# Patient Record
Sex: FEMALE | Race: WHITE | NOT HISPANIC OR LATINO | URBAN - METROPOLITAN AREA
[De-identification: names, ages, dates, MRNs, and addresses within clinical notes are randomized per-mention and may not be internally consistent; named-entity substitution may affect disease eponyms.]

---

## 2019-08-11 ENCOUNTER — EMERGENCY (EMERGENCY)
Facility: HOSPITAL | Age: 44
LOS: 0 days | Discharge: HOME | End: 2019-08-11
Attending: EMERGENCY MEDICINE | Admitting: EMERGENCY MEDICINE
Payer: COMMERCIAL

## 2019-08-11 VITALS
OXYGEN SATURATION: 98 % | DIASTOLIC BLOOD PRESSURE: 81 MMHG | SYSTOLIC BLOOD PRESSURE: 128 MMHG | RESPIRATION RATE: 16 BRPM

## 2019-08-11 VITALS
WEIGHT: 195.11 LBS | SYSTOLIC BLOOD PRESSURE: 155 MMHG | TEMPERATURE: 99 F | OXYGEN SATURATION: 98 % | HEART RATE: 86 BPM | DIASTOLIC BLOOD PRESSURE: 114 MMHG | RESPIRATION RATE: 17 BRPM

## 2019-08-11 DIAGNOSIS — N83.201 UNSPECIFIED OVARIAN CYST, RIGHT SIDE: ICD-10-CM

## 2019-08-11 DIAGNOSIS — R93.89 ABNORMAL FINDINGS ON DIAGNOSTIC IMAGING OF OTHER SPECIFIED BODY STRUCTURES: ICD-10-CM

## 2019-08-11 DIAGNOSIS — R10.9 UNSPECIFIED ABDOMINAL PAIN: ICD-10-CM

## 2019-08-11 DIAGNOSIS — Z88.8 ALLERGY STATUS TO OTHER DRUGS, MEDICAMENTS AND BIOLOGICAL SUBSTANCES STATUS: ICD-10-CM

## 2019-08-11 LAB
ALBUMIN SERPL ELPH-MCNC: 4.4 G/DL — SIGNIFICANT CHANGE UP (ref 3.5–5.2)
ALP SERPL-CCNC: 56 U/L — SIGNIFICANT CHANGE UP (ref 30–115)
ALT FLD-CCNC: 12 U/L — SIGNIFICANT CHANGE UP (ref 0–41)
ANION GAP SERPL CALC-SCNC: 11 MMOL/L — SIGNIFICANT CHANGE UP (ref 7–14)
APPEARANCE UR: ABNORMAL
AST SERPL-CCNC: 16 U/L — SIGNIFICANT CHANGE UP (ref 0–41)
BACTERIA # UR AUTO: ABNORMAL
BASOPHILS # BLD AUTO: 0.04 K/UL — SIGNIFICANT CHANGE UP (ref 0–0.2)
BASOPHILS NFR BLD AUTO: 0.3 % — SIGNIFICANT CHANGE UP (ref 0–1)
BILIRUB SERPL-MCNC: 1 MG/DL — SIGNIFICANT CHANGE UP (ref 0.2–1.2)
BILIRUB UR-MCNC: NEGATIVE — SIGNIFICANT CHANGE UP
BUN SERPL-MCNC: 11 MG/DL — SIGNIFICANT CHANGE UP (ref 10–20)
CALCIUM SERPL-MCNC: 9.6 MG/DL — SIGNIFICANT CHANGE UP (ref 8.5–10.1)
CHLORIDE SERPL-SCNC: 102 MMOL/L — SIGNIFICANT CHANGE UP (ref 98–110)
CO2 SERPL-SCNC: 25 MMOL/L — SIGNIFICANT CHANGE UP (ref 17–32)
COLOR SPEC: YELLOW — SIGNIFICANT CHANGE UP
CREAT SERPL-MCNC: 0.8 MG/DL — SIGNIFICANT CHANGE UP (ref 0.7–1.5)
DIFF PNL FLD: ABNORMAL
EOSINOPHIL # BLD AUTO: 0.1 K/UL — SIGNIFICANT CHANGE UP (ref 0–0.7)
EOSINOPHIL NFR BLD AUTO: 0.8 % — SIGNIFICANT CHANGE UP (ref 0–8)
EPI CELLS # UR: ABNORMAL /HPF
GLUCOSE SERPL-MCNC: 84 MG/DL — SIGNIFICANT CHANGE UP (ref 70–99)
GLUCOSE UR QL: NEGATIVE MG/DL — SIGNIFICANT CHANGE UP
HCT VFR BLD CALC: 40.7 % — SIGNIFICANT CHANGE UP (ref 37–47)
HGB BLD-MCNC: 13.7 G/DL — SIGNIFICANT CHANGE UP (ref 12–16)
IMM GRANULOCYTES NFR BLD AUTO: 0.3 % — SIGNIFICANT CHANGE UP (ref 0.1–0.3)
KETONES UR-MCNC: NEGATIVE — SIGNIFICANT CHANGE UP
LEUKOCYTE ESTERASE UR-ACNC: ABNORMAL
LYMPHOCYTES # BLD AUTO: 1.34 K/UL — SIGNIFICANT CHANGE UP (ref 1.2–3.4)
LYMPHOCYTES # BLD AUTO: 11.2 % — LOW (ref 20.5–51.1)
MCHC RBC-ENTMCNC: 29.2 PG — SIGNIFICANT CHANGE UP (ref 27–31)
MCHC RBC-ENTMCNC: 33.7 G/DL — SIGNIFICANT CHANGE UP (ref 32–37)
MCV RBC AUTO: 86.8 FL — SIGNIFICANT CHANGE UP (ref 81–99)
MONOCYTES # BLD AUTO: 0.84 K/UL — HIGH (ref 0.1–0.6)
MONOCYTES NFR BLD AUTO: 7 % — SIGNIFICANT CHANGE UP (ref 1.7–9.3)
NEUTROPHILS # BLD AUTO: 9.56 K/UL — HIGH (ref 1.4–6.5)
NEUTROPHILS NFR BLD AUTO: 80.4 % — HIGH (ref 42.2–75.2)
NITRITE UR-MCNC: NEGATIVE — SIGNIFICANT CHANGE UP
NRBC # BLD: 0 /100 WBCS — SIGNIFICANT CHANGE UP (ref 0–0)
PH UR: 5.5 — SIGNIFICANT CHANGE UP (ref 5–8)
PLATELET # BLD AUTO: 191 K/UL — SIGNIFICANT CHANGE UP (ref 130–400)
POTASSIUM SERPL-MCNC: 4 MMOL/L — SIGNIFICANT CHANGE UP (ref 3.5–5)
POTASSIUM SERPL-SCNC: 4 MMOL/L — SIGNIFICANT CHANGE UP (ref 3.5–5)
PROT SERPL-MCNC: 6.8 G/DL — SIGNIFICANT CHANGE UP (ref 6–8)
PROT UR-MCNC: 30 MG/DL
RBC # BLD: 4.69 M/UL — SIGNIFICANT CHANGE UP (ref 4.2–5.4)
RBC # FLD: 12.6 % — SIGNIFICANT CHANGE UP (ref 11.5–14.5)
RBC CASTS # UR COMP ASSIST: >50 /HPF
SODIUM SERPL-SCNC: 138 MMOL/L — SIGNIFICANT CHANGE UP (ref 135–146)
SP GR SPEC: 1.02 — SIGNIFICANT CHANGE UP (ref 1.01–1.03)
UROBILINOGEN FLD QL: 0.2 MG/DL — SIGNIFICANT CHANGE UP (ref 0.2–0.2)
WBC # BLD: 11.92 K/UL — HIGH (ref 4.8–10.8)
WBC # FLD AUTO: 11.92 K/UL — HIGH (ref 4.8–10.8)
WBC UR QL: SIGNIFICANT CHANGE UP /HPF

## 2019-08-11 PROCEDURE — 76856 US EXAM PELVIC COMPLETE: CPT | Mod: 26

## 2019-08-11 PROCEDURE — 99284 EMERGENCY DEPT VISIT MOD MDM: CPT

## 2019-08-11 PROCEDURE — 76705 ECHO EXAM OF ABDOMEN: CPT | Mod: 26

## 2019-08-11 RX ORDER — KETOROLAC TROMETHAMINE 30 MG/ML
30 SYRINGE (ML) INJECTION ONCE
Refills: 0 | Status: DISCONTINUED | OUTPATIENT
Start: 2019-08-11 | End: 2019-08-11

## 2019-08-11 RX ADMIN — Medication 30 MILLIGRAM(S): at 15:42

## 2019-08-11 NOTE — ED PROVIDER NOTE - CPE EDP [facility] NORM
DATE OF SERVICE   4/3/2018     HISTORY OF PRESENT ILLNESS   Josiane is a 51 year old female who presented to my office today for    Chief Complaint   Patient presents with   • Physical      1. Essential hypertension. Today blood pressure was 118/70. She continues taking Norvasc daily. Requests refill.   2. Uncomplicated asthma. She continues taking Albuterol inhaler as needed. Denies shortness of breath.   Today Josiane is here for annual physical examination. She does  examine her breasts every month. She has not noticed lumps or nipple discharge. She had annual mammogram done yesterday, 4-.     ALLERGIES     ALLERGIES:   Allergen Reactions   • Bee Sting ANAPHYLAXIS   • Demerol HIVES   • Sulfa Antibiotics HIVES   • Allergy Other (See Comments)     Etherified starches--corn products   • Lamotrigine [Lamictal Xr] HEADACHES, NAUSEA and CARDIAC DISTURBANCES     ONLY HAS PROBLEMS WITH GENERIC LAMICTAL   • Penicillin V Potassium Nausea & Vomiting   • Vimpat [Lacosamide] DEPRESSION        MEDICATIONS     Current Outpatient Prescriptions   Medication Sig Dispense Refill   • Vitamin D, Ergocalciferol, 80602 units capsule Take 1 capsule twice weekly for eight weeks, then once weekly for 8 weeks.  Recheck lab in 8 weeks 24 capsule 0   • meloxicam (MOBIC) 15 MG tablet Take 1 tablet by mouth daily. With breakfast 30 tablet 3   • OXTELLAR  MG TABLET SR 24 HR TAKE ONE TABLET BY MOUTH DAILY 30 tablet 1   • promethazine-codeine (PHENERGAN WITH CODEINE) 6.25-10 MG/5ML syrup Take 5 mLs by mouth 3 times daily as needed for Cough. 180 mL 0   • amLODIPine (NORVASC) 5 MG tablet Take 1 tablet by mouth daily. 90 tablet 1   • cyclobenzaprine (FLEXERIL) 10 MG tablet Take 1 tab TID PRN for muscle spasms 30 tablet 2   • HYDROcodone-acetaminophen (NORCO) 5-325 MG per tablet Take 1 tablet by mouth every 6 hours as needed for Pain. 30 tablet 0   • albuterol 108 (90 BASE) MCG/ACT inhaler Inhale 2 puffs into the lungs every 4 hours  as needed for Shortness of Breath or Wheezing. 1 Inhaler 0   • Naphazoline-Pheniramine (OPCON-A) 0.027-0.315 % SOLN Place 1 drop into both eyes daily.     • aspirin 325 MG tablet Take 325 mg by mouth daily.         No current facility-administered medications for this visit.         PAST MEDICAL HISTORY     Past Medical History:   Diagnosis Date   • Asthma    • Bronchitis    • Cerebral infarction (CMS/HCC)    • Disorders of bursae and tendons in shoulder region, unspecified 2013   • Failed moderate sedation during procedure 2017    awake during colonoscopy   • Head ache    • Hypertension    • OA (osteoarthritis) of knee 2013   • PONV (postoperative nausea and vomiting)    • Pseudoseizures    • RAD (reactive airway disease)    • Seizure disorder    • TIA         Past Surgical History:   Procedure Laterality Date   •  delivery+postpartum care       x 2   •  section, low transverse     • Hysterectomy  10/21/2011    bleeding ,no ooph   • Knee scope,diagnostic  2011    L Knee Arthroscopy   • Laparoscopy, cholecystectomy      Cholecystectomy, laparoscopic       Family History   Problem Relation Age of Onset   • Heart Father    • Hypertension Father    • Heart disease Father      heart disease   • Stroke Father    • Hypertension Sister    • Hypertension Brother    • Hypertension Paternal Grandmother    • Hypertension Maternal Grandfather        Social History     Social History   • Marital status:      Spouse name: N/A   • Number of children: N/A   • Years of education: N/A     Occupational History   • special  [employer]     Social History Main Topics   • Smoking status: Never Smoker   • Smokeless tobacco: Never Used   • Alcohol use No   • Drug use: No   • Sexual activity: Not on file     Other Topics Concern   • Seat Belt Yes     Social History Narrative   • No narrative on file       REVIEW OF SYSTEMS   SKIN:  Negative bruising,  bleeding.  Negative lesion change.  HEENT:  Denies headache or vertigo.  Eyes:  Has a routine eye exam.  Denies vision changes or diplopia.  Ears, nares, sinuses:  Negative ear pain, tinnitus, or sinus congestion.  Pharynx:  Negative pharyngitis or dysphagia.  Has a routine dental exam.  CARDIOVASCULAR/RESPIRATORY:  Please see HPI.  BREASTS: Denies breast lumps or nipple discharge. Negative tenderness.  GASTROINTESTINAL/RECTAL:  Denies any nausea, vomiting, weight loss, weight gain, constipation or abdominal pain.  GENITOURINARY: Denies any urethral discharge, dysuria, hematuria, or sores.  NEUROLOGICAL:  Negative paresthesias or tremors.  MUSCULOSKELETAL:  Denies any joint pain or swelling.  PERIPHERAL VASCULAR:  Negative claudication.  Negative edema.  ENDOCRINE:  Denies polyphagia, polydipsia, or fatigue.  PSYCHOLOGICAL:  Denies anxiety, insomnia, decreased concentration.    PHYSICAL EXAMINATION   VITALS: Blood pressure 118/70, pulse 60, resp. rate 16, height 5' 5\" (1.651 m), weight 127.5 kg, SpO2 98 %.   GENERAL:  This is an alert, pleasant, appropriate  51 year old female in no apparent distress.  SKIN:  Warm and dry.  HEENT:  Eyes:  Pupils equal, round, and reactive to light and accommodation without conjunctival or scleral erythema.  Ears:  Tympanic membranes are intact.  Nares/Sinuses:  Patent/Nontender.  Pharynx is pink without exudate.  NECK:  Supple without adenopathy, thyromegaly, or bruits.  LUNGS:  Clear without rales, wheezing, or crackles.  CARDIOVASCULAR:  S1, S2, regular rhythm without murmur.  BREASTS:  Deferred.  ABDOMEN:  Soft without hepatomegaly or splenomegaly or costovertebral angle tenderness.  EXTREMITIES: No cyanosis, clubbing or edema. Pulses are normal.  GYNECOLOGIC: Deferred  RECTAL:  Deferred  MUSCULOSKELETAL:  Equal range of motion. Spine is straight.  NEUROLOGICAL:  Deep tendon reflexes and sensation are equal bilaterally. Cranial nerves II-XII intact bilaterally.  Motor exam upper  and lower extremities 5/5 with reflexes 2+ bilaterally.    ASSESSMENT/PLAN     1. Health Maintenance.  2. Essential hypertension. Continue taking Norvasc 5 mg once daily #90/1RF sent to pharmacy of choice.   3. Uncomplicated asthma. Continue using Albuterol 108 mcg/act inhaler, two puffs every four hours as needed.    4. Annual physical examination. Josiane is strongly encouraged to do monthly self-breast examination. Josiane is not due to have annual mammogram until April 2018.   5. She is scheduled for fasting labs of CBC, CMP, Lipid panel, TSH and UA which will be done today.   6. She is advised to return to my office for follow up in 6 months.             On 4/3/2018, ISteffanie CMA scribed the services personally performed by Tiana Valverde MD     The documentation recorded by the scribe accurately and completely reflects the service(s) I personally performed and the decisions made by me.            normal...

## 2019-08-11 NOTE — ED PROVIDER NOTE - NSFOLLOWUPCLINICS_GEN_ALL_ED_FT
[facility]  OB/GYN  [address] [place] [address]  Phone: [phone]  Fax:   Follow Up Time: 7-10 Days

## 2019-08-11 NOTE — ED PROVIDER NOTE - OBJECTIVE STATEMENT
44 y/o female presents c/o right lower abdominal pain with heavy vaginal bleeding with clots. patient denies any fever,chills. patient without any dysuria, back pain. patient with loss of appetite. patient denies any vomiting or diarrhea. no change with food intake. patient denies any weakness, sob, cp

## 2019-08-11 NOTE — ED PROVIDER NOTE - ATTENDING CONTRIBUTION TO CARE
43 Y F no PMH pw RUQ and RLQ abd pain, occurring monthly associated with her menstruation, w/o vomiting, diarrhea, dysuria, hematuria, numbness, tingling, weakness worsening x 2 days. Worst in RLQ today, with RUQ not currently causing symptoms. No association with food.  Exam: NAD, NCAT, HEENT: mmm, EOMI, PERRLA, Neck: supple, nontender, nl ROM, Heart: RRR, no murmur, Lungs: BCTA, no signs of increased WOB, Abd: Right low abd ttp. right flank tenderness. NTND, no guarding or rebound, no hernia palpated. MSK: chest, back, and ext nontender, nl rom, no deformity. Neuro: A&Ox3, normal strength, nl sensation throughout, normal speech.  A/P: Offered CT vs US for initial eval. Elects ultrasound. Concern for ovarian or uterine pathology given menstrual association, less likely appendicitis given recurrence each month, and pt elects for US initially. Declines pain control. Labs, imaging, fluids, symptom control, reassess. 43 Y F no PMH pw RUQ and RLQ abd pain, occurring monthly associated with her menstruation, w/o vomiting, diarrhea, dysuria, hematuria, numbness, tingling, weakness worsening x 2 days. Worst in RLQ today, with RUQ not currently causing symptoms. No association with food. + heavier period currently.   Exam: NAD, NCAT, HEENT: mmm, EOMI, PERRLA, Neck: supple, nontender, nl ROM, Heart: RRR, no murmur, Lungs: BCTA, no signs of increased WOB, Abd: Right low abd ttp. right flank tenderness. NTND, no guarding or rebound, no hernia palpated. MSK: chest, back, and ext nontender, nl rom, no deformity. Neuro: A&Ox3, normal strength, nl sensation throughout, normal speech.  A/P: Offered CT vs US for initial eval. Elects ultrasound. Concern for ovarian or uterine pathology given menstrual association, less likely appendicitis given recurrence each month, and pt elects for US initially. Declines pain control. Labs, imaging, fluids, symptom control, reassess.

## 2019-08-11 NOTE — ED PROVIDER NOTE - CLINICAL SUMMARY MEDICAL DECISION MAKING FREE TEXT BOX
pw right LQ abd pain, coming and going with menses. Also with RUQ abd pain. Eval shows ovarian cyst, and thickened endometrial stripe. dw patient and family. Patient to be discharged from ED. Any available test results were discussed with patient and/or family. Verbal instructions given, including instructions to return to ED immediately for any new, worsening, or concerning symptoms. Patient endorsed understanding. Written discharge instructions additionally given, including follow-up plan with OBGYN.

## 2019-08-12 LAB
CULTURE RESULTS: SIGNIFICANT CHANGE UP
SPECIMEN SOURCE: SIGNIFICANT CHANGE UP

## 2020-11-03 NOTE — ED ADULT NURSE NOTE - NSIMPLEMENTINTERV_GEN_ALL_ED
Implemented All Fall Risk Interventions:  [place] to call system. Call bell, personal items and telephone within reach. Instruct patient to call for assistance. Room bathroom lighting operational. Non-slip footwear when patient is off stretcher. Physically safe environment: no spills, clutter or unnecessary equipment. Stretcher in lowest position, wheels locked, appropriate side rails in place. Provide visual cue, wrist band, yellow gown, etc. Monitor gait and stability. Monitor for mental status changes and reorient to person, place, and time. Review medications for side effects contributing to fall risk. Reinforce activity limits and safety measures with patient and family. yes

## 2024-04-15 NOTE — ED ADULT NURSE NOTE - MODE OF DISCHARGE
Detail Level: Detailed Products Recommended: For cosmetic elegance on face, recommended EltaMD, LaRoche-Posay, Cotz, Colorescience, Alastin, Tizo. General Sunscreen Counseling: I recommended a physical (zinc or titanium based) water-resistant broad spectrum sunscreen with a SPF of 30 or higher and should be re-applied every 2-3 hours; pt also counseled on wearing hats and sun protective clothing with UPF. \\n\\nTold to call office if notices any new lesions of concern or changing lesions (new symptoms, change in size/symmetry) Ambulatory